# Patient Record
Sex: FEMALE | URBAN - METROPOLITAN AREA
[De-identification: names, ages, dates, MRNs, and addresses within clinical notes are randomized per-mention and may not be internally consistent; named-entity substitution may affect disease eponyms.]

---

## 2018-03-10 ENCOUNTER — EMERGENCY (EMERGENCY)
Facility: HOSPITAL | Age: 33
LOS: 1 days | Discharge: HOME | End: 2018-03-10
Admitting: EMERGENCY MEDICINE
Payer: MEDICAID

## 2018-03-10 VITALS
RESPIRATION RATE: 16 BRPM | SYSTOLIC BLOOD PRESSURE: 106 MMHG | TEMPERATURE: 98 F | DIASTOLIC BLOOD PRESSURE: 69 MMHG | OXYGEN SATURATION: 100 % | HEART RATE: 79 BPM

## 2018-03-10 VITALS
RESPIRATION RATE: 17 BRPM | TEMPERATURE: 98 F | OXYGEN SATURATION: 100 % | HEART RATE: 74 BPM | DIASTOLIC BLOOD PRESSURE: 66 MMHG | SYSTOLIC BLOOD PRESSURE: 102 MMHG

## 2018-03-10 PROCEDURE — 99284 EMERGENCY DEPT VISIT MOD MDM: CPT

## 2018-03-10 PROCEDURE — 73562 X-RAY EXAM OF KNEE 3: CPT | Mod: 26,LT

## 2018-03-10 RX ORDER — ONDANSETRON 8 MG/1
4 TABLET, FILM COATED ORAL ONCE
Qty: 0 | Refills: 0 | Status: COMPLETED | OUTPATIENT
Start: 2018-03-10 | End: 2018-03-10

## 2018-03-10 RX ADMIN — ONDANSETRON 4 MILLIGRAM(S): 8 TABLET, FILM COATED ORAL at 17:57

## 2018-03-10 NOTE — ED PROVIDER NOTE - OBJECTIVE STATEMENT
34 y/o female, no pmh, c/o left patella dislocation at home today after turning and raising leg to get onto her bed. Denies falling. Pt was given fentanyl 25 mcg by EMS and patella was reduced. Denies any other injuries, pain. Pt feeling a little nauseous from fentanyl.

## 2018-03-10 NOTE — ED PROVIDER NOTE - CARE PLAN
2cc of air removed from both right vasc band. Site appears with same small amount edematous , but is soft.    Principal Discharge DX:	Patellar dislocation, left, initial encounter

## 2018-03-10 NOTE — ED ADULT TRIAGE NOTE - CHIEF COMPLAINT QUOTE
pt was walking in her house and her left knee gave out as per EMS pt knee was dislocated it was placed back by EMS and placed on a split.  Pt foot is cold to touch pedal pulse is present leg is warm to touch.  Pt was given 50mcg of iv fentanyl pt is currently c/o of nausea.